# Patient Record
Sex: FEMALE | Race: WHITE | HISPANIC OR LATINO | Employment: FULL TIME | URBAN - METROPOLITAN AREA
[De-identification: names, ages, dates, MRNs, and addresses within clinical notes are randomized per-mention and may not be internally consistent; named-entity substitution may affect disease eponyms.]

---

## 2021-02-08 ENCOUNTER — APPOINTMENT (EMERGENCY)
Dept: RADIOLOGY | Facility: HOSPITAL | Age: 31
End: 2021-02-08
Payer: COMMERCIAL

## 2021-02-08 ENCOUNTER — HOSPITAL ENCOUNTER (EMERGENCY)
Facility: HOSPITAL | Age: 31
Discharge: HOME | End: 2021-02-08
Attending: EMERGENCY MEDICINE | Admitting: EMERGENCY MEDICINE
Payer: COMMERCIAL

## 2021-02-08 VITALS
OXYGEN SATURATION: 100 % | HEART RATE: 68 BPM | HEIGHT: 65 IN | SYSTOLIC BLOOD PRESSURE: 104 MMHG | RESPIRATION RATE: 18 BRPM | DIASTOLIC BLOOD PRESSURE: 60 MMHG | BODY MASS INDEX: 32.65 KG/M2 | WEIGHT: 196 LBS | TEMPERATURE: 98.8 F

## 2021-02-08 DIAGNOSIS — O20.0 THREATENED MISCARRIAGE: Primary | ICD-10-CM

## 2021-02-08 DIAGNOSIS — R07.9 CHEST PAIN, UNSPECIFIED TYPE: ICD-10-CM

## 2021-02-08 DIAGNOSIS — O20.9 VAGINAL BLEEDING IN PREGNANCY, FIRST TRIMESTER: ICD-10-CM

## 2021-02-08 LAB
ABO GROUP BLD: NORMAL
ALBUMIN SERPL BCP-MCNC: 4.2 G/DL (ref 3.4–4.8)
ALP SERPL-CCNC: 46.6 U/L (ref 35–140)
ALT SERPL W P-5'-P-CCNC: 15 U/L (ref 5–54)
ANION GAP SERPL CALCULATED.3IONS-SCNC: 8 MMOL/L (ref 4–13)
AST SERPL W P-5'-P-CCNC: 12 U/L (ref 15–41)
ATRIAL RATE: 61 BPM
B-HCG SERPL-ACNC: ABNORMAL MIU/ML
BASOPHILS # BLD AUTO: 0.01 THOUSANDS/ΜL (ref 0–0.1)
BASOPHILS NFR BLD AUTO: 0 % (ref 0–1)
BILIRUB SERPL-MCNC: 0.33 MG/DL (ref 0.3–1.2)
BUN SERPL-MCNC: 10 MG/DL (ref 6–20)
CALCIUM SERPL-MCNC: 9.4 MG/DL (ref 8.4–10.2)
CHLORIDE SERPL-SCNC: 103 MMOL/L (ref 96–108)
CO2 SERPL-SCNC: 27 MMOL/L (ref 22–33)
CREAT SERPL-MCNC: 0.59 MG/DL (ref 0.4–1.1)
EOSINOPHIL # BLD AUTO: 0.05 THOUSAND/ΜL (ref 0–0.61)
EOSINOPHIL NFR BLD AUTO: 1 % (ref 0–6)
ERYTHROCYTE [DISTWIDTH] IN BLOOD BY AUTOMATED COUNT: 12.4 % (ref 11.6–15.1)
GFR SERPL CREATININE-BSD FRML MDRD: 124 ML/MIN/1.73SQ M
GLUCOSE SERPL-MCNC: 88 MG/DL (ref 65–140)
HCT VFR BLD AUTO: 35.8 % (ref 34.8–46.1)
HGB BLD-MCNC: 12.5 G/DL (ref 11.5–15.4)
IMM GRANULOCYTES # BLD AUTO: 0.02 THOUSAND/UL (ref 0–0.2)
IMM GRANULOCYTES NFR BLD AUTO: 0 % (ref 0–2)
LYMPHOCYTES # BLD AUTO: 2.38 THOUSANDS/ΜL (ref 0.6–4.47)
LYMPHOCYTES NFR BLD AUTO: 28 % (ref 14–44)
MCH RBC QN AUTO: 29.2 PG (ref 26.8–34.3)
MCHC RBC AUTO-ENTMCNC: 34.9 G/DL (ref 31.4–37.4)
MCV RBC AUTO: 84 FL (ref 82–98)
MONOCYTES # BLD AUTO: 0.5 THOUSAND/ΜL (ref 0.17–1.22)
MONOCYTES NFR BLD AUTO: 6 % (ref 4–12)
NEUTROPHILS # BLD AUTO: 5.71 THOUSANDS/ΜL (ref 1.85–7.62)
NEUTS SEG NFR BLD AUTO: 65 % (ref 43–75)
P AXIS: 45 DEGREES
PLATELET # BLD AUTO: 208 THOUSANDS/UL (ref 149–390)
PMV BLD AUTO: 10.8 FL (ref 8.9–12.7)
POTASSIUM SERPL-SCNC: 3.8 MMOL/L (ref 3.5–5)
PR INTERVAL: 166 MS
PROT SERPL-MCNC: 6.6 G/DL (ref 6.4–8.3)
QRS AXIS: 40 DEGREES
QRSD INTERVAL: 85 MS
QT INTERVAL: 398 MS
QTC INTERVAL: 414 MS
RBC # BLD AUTO: 4.28 MILLION/UL (ref 3.81–5.12)
RH BLD: POSITIVE
SODIUM SERPL-SCNC: 138 MMOL/L (ref 133–145)
SPECIMEN EXPIRATION DATE: NORMAL
T WAVE AXIS: 41 DEGREES
TROPONIN I SERPL-MCNC: <0.03 NG/ML (ref 0–0.07)
VENTRICULAR RATE: 65 BPM
WBC # BLD AUTO: 8.67 THOUSAND/UL (ref 4.31–10.16)

## 2021-02-08 PROCEDURE — 80053 COMPREHEN METABOLIC PANEL: CPT | Performed by: EMERGENCY MEDICINE

## 2021-02-08 PROCEDURE — 99285 EMERGENCY DEPT VISIT HI MDM: CPT | Performed by: EMERGENCY MEDICINE

## 2021-02-08 PROCEDURE — 36415 COLL VENOUS BLD VENIPUNCTURE: CPT | Performed by: EMERGENCY MEDICINE

## 2021-02-08 PROCEDURE — 86900 BLOOD TYPING SEROLOGIC ABO: CPT | Performed by: EMERGENCY MEDICINE

## 2021-02-08 PROCEDURE — 86901 BLOOD TYPING SEROLOGIC RH(D): CPT | Performed by: EMERGENCY MEDICINE

## 2021-02-08 PROCEDURE — 85025 COMPLETE CBC W/AUTO DIFF WBC: CPT | Performed by: EMERGENCY MEDICINE

## 2021-02-08 PROCEDURE — 86850 RBC ANTIBODY SCREEN: CPT | Performed by: EMERGENCY MEDICINE

## 2021-02-08 PROCEDURE — 93010 ELECTROCARDIOGRAM REPORT: CPT | Performed by: INTERNAL MEDICINE

## 2021-02-08 PROCEDURE — 84484 ASSAY OF TROPONIN QUANT: CPT | Performed by: EMERGENCY MEDICINE

## 2021-02-08 PROCEDURE — 71045 X-RAY EXAM CHEST 1 VIEW: CPT

## 2021-02-08 PROCEDURE — 99285 EMERGENCY DEPT VISIT HI MDM: CPT

## 2021-02-08 PROCEDURE — 84702 CHORIONIC GONADOTROPIN TEST: CPT | Performed by: EMERGENCY MEDICINE

## 2021-02-08 PROCEDURE — 96360 HYDRATION IV INFUSION INIT: CPT

## 2021-02-08 PROCEDURE — 93005 ELECTROCARDIOGRAM TRACING: CPT

## 2021-02-08 RX ORDER — FERROUS SULFATE 325(65) MG
325 TABLET ORAL EVERY OTHER DAY
COMMUNITY

## 2021-02-08 RX ADMIN — SODIUM CHLORIDE 1000 ML: 0.9 INJECTION, SOLUTION INTRAVENOUS at 19:42

## 2021-02-09 NOTE — ED PROVIDER NOTES
History  Chief Complaint   Patient presents with    Chest Pain     chest pain x1 week covid (+) positive also started with spotting today and is 12 weeks preg      HPI    34 yo F  (9 miscarriages) at 12 weeks presenting to the ed for eval of vaginal bleeding and chest pain  IUP confirmed by US 21  Covid pos 21  Patient having constant chest pain with palpitations  Present for past one week worse now  2/10 currently  Has pleuritic pain  Present since patient has had covid  Has intermittent lightheadedness  Has nausea but no emesis  Patient also started spotting and cramping today  No fetal movement yet  No sob  Not changing any pads per hour  FHR: 160 on transabdominal u/s  Prior to Admission Medications   Prescriptions Last Dose Informant Patient Reported? Taking? Prenatal Vit-Fe Fumarate-FA (PRENATAL PO)   Yes No   Sig: Take 1 tablet by mouth daily   ferrous sulfate 325 (65 Fe) mg tablet 2021 at Unknown time  Yes Yes   Sig: Take 325 mg by mouth every other day      Facility-Administered Medications: None       No past medical history on file  No past surgical history on file  Family History   Problem Relation Age of Onset    Hypertension Mother     Diabetes type II Father         MELLITUS    Hypertension Father     Brain cancer Sister     Diabetes type II Paternal Grandfather         MELLITUS    Colon cancer Paternal Grandfather     Hypertension Paternal Grandfather      I have reviewed and agree with the history as documented  E-Cigarette/Vaping     E-Cigarette/Vaping Substances     Social History     Tobacco Use    Smoking status: Never Smoker    Tobacco comment: NO TOBACCO USE   Substance Use Topics    Alcohol use: Not on file    Drug use: Not on file       Review of Systems   Constitutional: Negative for chills, fatigue and fever  HENT: Negative for sore throat  Eyes: Negative for redness and visual disturbance     Respiratory: Negative for cough and shortness of breath  Cardiovascular: Positive for chest pain  Gastrointestinal: Negative for abdominal pain, diarrhea and nausea  Genitourinary: Positive for vaginal bleeding  Negative for difficulty urinating, dysuria, pelvic pain and vaginal pain  Musculoskeletal: Negative for back pain  Skin: Negative for rash  Neurological: Negative for syncope, weakness and headaches  All other systems reviewed and are negative  Physical Exam  Physical Exam  Vitals signs and nursing note reviewed  Constitutional:       General: She is not in acute distress  HENT:      Head: Normocephalic and atraumatic  Eyes:      Conjunctiva/sclera: Conjunctivae normal    Neck:      Musculoskeletal: Normal range of motion  Cardiovascular:      Rate and Rhythm: Normal rate and regular rhythm  Heart sounds: Normal heart sounds  Comments: No tachycardia  Pulmonary:      Effort: Pulmonary effort is normal  No respiratory distress  Breath sounds: Normal breath sounds  Comments: No tachypnea or hypoxia  100% on room air  Abdominal:      General: Bowel sounds are normal       Palpations: Abdomen is soft  Tenderness: There is no abdominal tenderness  Genitourinary:     Comments: Cervical os closed at this time  No blood in vaginal canal  Musculoskeletal: Normal range of motion  Skin:     General: Skin is warm and dry  Findings: No rash  Neurological:      Mental Status: She is alert and oriented to person, place, and time  Cranial Nerves: No cranial nerve deficit  Sensory: No sensory deficit  Motor: No abnormal muscle tone        Coordination: Coordination normal          Vital Signs  ED Triage Vitals [02/08/21 1905]   Temperature Pulse Respirations Blood Pressure SpO2   98 8 °F (37 1 °C) 61 17 106/59 100 %      Temp Source Heart Rate Source Patient Position - Orthostatic VS BP Location FiO2 (%)   Oral Monitor Sitting Left arm --      Pain Score       3           Vitals: 02/08/21 1905 02/08/21 2058   BP: 106/59 104/60   Pulse: 61 68   Patient Position - Orthostatic VS: Sitting Lying         Visual Acuity      ED Medications  Medications   sodium chloride 0 9 % bolus 1,000 mL (0 mL Intravenous Stopped 2/8/21 2058)       Diagnostic Studies  Results Reviewed     Procedure Component Value Units Date/Time    hCG, quantitative [[id number]]  (Abnormal) Collected: 02/08/21 1939    Lab Status: Final result Specimen: Blood from Arm, Right Updated: 02/08/21 2032     HCG, Quant 67,296 0 mIU/mL     Narrative:      Non pregnant females:          <5 mIU/mL  Pregnant females, weeks post last menstrual period:  3 - 4 weeks                9 - 130 mIU/mL  4 - 5 weeks                75 - 2,600 mIU/mL  5 - 6 weeks                850 - 20,800 mIU/mL  6 - 7 weeks                4,000 - 100,200 mIU/mL  7 - 12 weeks               11,500 - 289,000 mIU/mL  12 - 16 weeks              18,300 - 137,000 mIU/mL  16 - 29 weeks              1,400 - 53,000 mIU/mL (second trimester)  29 - 41 weeks              940 - 60,000 mIU/mL (third trimester)      Troponin I [[id number]]  (Normal) Collected: 02/08/21 1939    Lab Status: Final result Specimen: Blood from Arm, Right Updated: 02/08/21 2007     Troponin I <0 03 ng/mL     Comprehensive metabolic panel [[id number]]  (Abnormal) Collected: 02/08/21 1939    Lab Status: Final result Specimen: Blood from Arm, Right Updated: 02/08/21 2005     Sodium 138 mmol/L      Potassium 3 8 mmol/L      Chloride 103 mmol/L      CO2 27 mmol/L      ANION GAP 8 mmol/L      BUN 10 mg/dL      Creatinine 0 59 mg/dL      Glucose 88 mg/dL      Calcium 9 4 mg/dL      AST 12 U/L      ALT 15 U/L      Alkaline Phosphatase 46 6 U/L      Total Protein 6 6 g/dL      Albumin 4 2 g/dL      Total Bilirubin 0 33 mg/dL      eGFR 124 ml/min/1 73sq m     Narrative:      [place] guidelines for Chronic Kidney Disease (CKD):     Stage 1 with normal or high GFR (GFR > 90 mL/min/1 73 square meters)    Stage 2 Mild CKD (GFR = 60-89 mL/min/1 73 square meters)    Stage 3A Moderate CKD (GFR = 45-59 mL/min/1 73 square meters)    Stage 3B Moderate CKD (GFR = 30-44 mL/min/1 73 square meters)    Stage 4 Severe CKD (GFR = 15-29 mL/min/1 73 square meters)    Stage 5 End Stage CKD (GFR <15 mL/min/1 73 square meters)  Note: GFR calculation is accurate only with a steady state creatinine    CBC and differential [[id number]]  (Normal) Collected: 02/08/21 1939    Lab Status: Final result Specimen: Blood from Arm, Right Updated: 02/08/21 1949     WBC 8 67 Thousand/uL      RBC 4 28 Million/uL      Hemoglobin 12 5 g/dL      Hematocrit 35 8 %      MCV 84 fL      MCH 29 2 pg      MCHC 34 9 g/dL      RDW 12 4 %      MPV 10 8 fL      Platelets 737 Thousands/uL      Neutrophils Relative 65 %      Immat GRANS % 0 %      Lymphocytes Relative 28 %      Monocytes Relative 6 %      Eosinophils Relative 1 %      Basophils Relative 0 %      Neutrophils Absolute 5 71 Thousands/µL      Immature Grans Absolute 0 02 Thousand/uL      Lymphocytes Absolute 2 38 Thousands/µL      Monocytes Absolute 0 50 Thousand/µL      Eosinophils Absolute 0 05 Thousand/µL      Basophils Absolute 0 01 Thousands/µL                  XR chest 1 view portable    (Results Pending)              Procedures  Procedures     [facility]  02/08/21 2041            Heart Score Risk Calculator   History  0          ECG  0          Age  0          Risk Factors  0          Troponin  0          HEART Score  0 (calculated)                  ED Course  ED Course as of Feb 08 2135 Mon Feb 08, 2021 2016 Troponin I: <0 03 2029 Rh Factor: Positive   2032 Spoke with Dr Homero Farley, for vaginal bleeding, only give rhogham if rh neg  Otherwise follow up in obgyn office, needs viability scan likely  For chest pain, patient offered ct pe study, as benefits outweigh risks   however patient at this point feels pain is not that bad and will defer ct scan understanding risks including fetal compromise demise, death, disability, including mother that, disability, undiagnosed blood clot  Patient states that she understands of the risks and would still not like to have CT PE study  2047 UA deferred, patient denies having any urinary symptoms, states she was here for her chest pain and bleeding  Would like to leave, without result for ua  [facility]    34 yo F presenting with chest pain and vaginal spotting  Exam within normal limits  Vitals normal  Known covid, likely chest pain from covid, though hypercoaguable, pe on differential  Spoke to ob, offered ct pe scan, patient declined understanding risks of not getting scan  States she will hold off for now  Froylan still has threatened miscarriage, will follow up with obgyn  Ophelia schaferham needed rh pos  The patient was instructed to follow up as documented  Strict return precautions were discussed with the patient and the patient was instructed to return to the emergency department immediately if symptoms worsen  The patient/patient family member acknowledged and were in agreement with plan  Disposition  Final diagnoses:   Threatened miscarriage   Vaginal bleeding in pregnancy, first trimester   Chest pain, unspecified type     Time reflects when diagnosis was documented in both MDM as applicable and the Disposition within this note     Time User Action Codes Description Comment    2/8/2021  8:40  [address], [address] [O20 0] Threatened miscarriage     2/8/2021  8:40  [address], [address] [O20 9] Vaginal bleeding in pregnancy, first trimester     2/8/2021  8:40 PM [employer] Add [R07 9] Chest pain, unspecified type       ED Disposition     ED Disposition Condition Date/Time Comment    Discharge Stable Mon Feb 8, 2021  8:41 PM Dwaine Acosta discharge to home/self care              Follow-up Information     Follow up With Specialties Details Why Contact [facility], Obstetrics, Gynecology Call in 1 day For follow up regarding your symptoms and recheck  you will likely need a repeat viaility ultrasound  also mention to them your chest pain, and you deferred a ct scan  [address]            Discharge Medication List as of 2/8/2021  8:41 PM      CONTINUE these medications which have NOT CHANGED    Details   ferrous sulfate 325 (65 Fe) mg tablet Take 325 mg by mouth every other day, Historical Med      Prenatal Vit-Fe Fumarate-FA (PRENATAL PO) Take 1 tablet by mouth daily, Historical Med           No discharge procedures on file      PDMP Review     None          ED Provider  Electronically Signed by           Susanne Hayes MD  02/08/21 9280

## 2025-02-22 ENCOUNTER — OFFICE VISIT (OUTPATIENT)
Dept: URGENT CARE | Facility: MEDICAL CENTER | Age: 35
End: 2025-02-22
Payer: COMMERCIAL

## 2025-02-22 VITALS
OXYGEN SATURATION: 98 % | SYSTOLIC BLOOD PRESSURE: 122 MMHG | DIASTOLIC BLOOD PRESSURE: 73 MMHG | TEMPERATURE: 98.9 F | HEART RATE: 80 BPM | RESPIRATION RATE: 18 BRPM

## 2025-02-22 DIAGNOSIS — R30.0 DYSURIA: Primary | ICD-10-CM

## 2025-02-22 LAB
SL AMB  POCT GLUCOSE, UA: ABNORMAL
SL AMB LEUKOCYTE ESTERASE,UA: ABNORMAL
SL AMB POCT BILIRUBIN,UA: ABNORMAL
SL AMB POCT BLOOD,UA: ABNORMAL
SL AMB POCT CLARITY,UA: ABNORMAL
SL AMB POCT COLOR,UA: YELLOW
SL AMB POCT KETONES,UA: ABNORMAL
SL AMB POCT NITRITE,UA: ABNORMAL
SL AMB POCT PH,UA: 7
SL AMB POCT SPECIFIC GRAVITY,UA: 1.01
SL AMB POCT URINE PROTEIN: ABNORMAL
SL AMB POCT UROBILINOGEN: 0.2

## 2025-02-22 PROCEDURE — 87086 URINE CULTURE/COLONY COUNT: CPT | Performed by: FAMILY MEDICINE

## 2025-02-22 PROCEDURE — 81002 URINALYSIS NONAUTO W/O SCOPE: CPT | Performed by: FAMILY MEDICINE

## 2025-02-22 PROCEDURE — 99213 OFFICE O/P EST LOW 20 MIN: CPT | Performed by: FAMILY MEDICINE

## 2025-02-22 RX ORDER — NITROFURANTOIN 25; 75 MG/1; MG/1
100 CAPSULE ORAL 2 TIMES DAILY
Qty: 10 CAPSULE | Refills: 0 | Status: SHIPPED | OUTPATIENT
Start: 2025-02-22 | End: 2025-02-27

## 2025-02-22 RX ORDER — METRONIDAZOLE 500 MG/1
500 TABLET ORAL EVERY 8 HOURS SCHEDULED
Qty: 21 TABLET | Refills: 0 | Status: SHIPPED | OUTPATIENT
Start: 2025-02-22 | End: 2025-03-01

## 2025-02-22 RX ORDER — ALBUTEROL SULFATE 90 UG/1
2 INHALANT RESPIRATORY (INHALATION) EVERY 6 HOURS PRN
COMMUNITY
Start: 2024-10-12

## 2025-02-22 NOTE — PROGRESS NOTES
[facility]        NAME: Guillermina Gross is a 35 y.o. female  : 1990    MRN: [id number]  DATE: 2025  TIME: 8:00 PM    Assessment and Plan   Dysuria [R30.0]  1. Dysuria  POCT urine dip    Urine culture    Urine culture    nitrofurantoin (MACROBID) 100 mg capsule    metroNIDAZOLE (FLAGYL) 500 mg tablet        UTI based on UA  Treated with antibiotics as above.  Will await C/S and change medication if needed.  Supportive care measures discussed  ED precautions discussed.    Also given metronidazole for presumptive BV. Use in sequence.   Discussed Return/ED parameters with patient.       Patient Instructions       Follow up with PCP in 3-5 days.  Proceed to  ER if symptoms worsen.    If tests have been performed at [facility], our office will contact you with results if changes need to be made to the care plan discussed with you at the visit.  [facility]    Chief Complaint     Chief Complaint   Patient presents with   • Vaginal Discharge     Patient states she believes that she may have bacterial vaginosis; had it multiple times while being pregnant and has similar discharge to previous times; no concern of STIs    • Female Dysuria     Urinary urgency, pressure, frequency; x3 months          History of Present Illness       2 months of vaginal discharge that has a fishy odor that is similar to multiple episodes of previous BV  Also has about 3 months of urinary discomfort, frequency, urgency. One episode of urinary incontinence.    Urinary Tract Infection   This is a new problem. The current episode started more than 1 month ago. The problem occurs every urination. The problem has been unchanged. The quality of the pain is described as burning. There has been no fever. Associated symptoms include a discharge, frequency and urgency. Pertinent negatives include no chills, flank pain, hematuria, hesitancy, nausea, possible pregnancy, sweats or  vomiting.       Review of Systems   Review of Systems   Constitutional:  Negative for chills.   Gastrointestinal:  Negative for nausea and vomiting.   Genitourinary:  Positive for frequency and urgency. Negative for flank pain, hematuria and hesitancy.         Current Medications       Current Outpatient Medications:   •  albuterol (PROVENTIL HFA,VENTOLIN HFA) 90 mcg/act inhaler, Inhale 2 puffs every 6 (six) hours as needed, Disp: , Rfl:   •  metroNIDAZOLE (FLAGYL) 500 mg tablet, Take 1 tablet (500 mg total) by mouth every 8 (eight) hours for 7 days, Disp: 21 tablet, Rfl: 0  •  nitrofurantoin (MACROBID) 100 mg capsule, Take 1 capsule (100 mg total) by mouth 2 (two) times a day for 5 days, Disp: 10 capsule, Rfl: 0  •  ascorbic acid (VITAMIN C) 250 MG CHEW, Chew, Disp: , Rfl:   •  ferrous sulfate 325 (65 Fe) mg tablet, Take 325 mg by mouth every other day, Disp: , Rfl:   •  Prenatal Vit-Fe Fumarate-FA (PRENATAL PO), Take 1 tablet by mouth daily, Disp: , Rfl:     Current Allergies     Allergies as of 02/22/2025 - Reviewed 02/22/2025   Allergen Reaction Noted   • Apple fruit extract - food allergy Hives 10/21/2020   • Bactrim [sulfamethoxazole-trimethoprim] Hives 02/08/2021   • Codeine Hives 02/08/2021   • Lactose - food allergy Other (See Comments) 08/29/2019   • Latex Hives 02/08/2021   • Influenza virus vaccine Rash 10/21/2020   • Insulin detemir Rash 04/22/2021   • Insulin glargine Rash 04/28/2021            The following portions of the patient's history were reviewed and updated as appropriate: allergies, current medications, past family history, past medical history, past social history, past surgical history and problem list.     History reviewed. No pertinent past medical history.    History reviewed. No pertinent surgical history.    Family History   Problem Relation Age of Onset   • Hypertension Mother    • Diabetes type II Father         MELLITUS   • Hypertension Father    • Brain cancer Sister    • Diabetes  type II Paternal Grandfather         MELLITUS   • Colon cancer Paternal Grandfather    • Hypertension Paternal Grandfather          Medications have been verified.        Objective   /73   Pulse 80   Temp 98.9 °F (37.2 °C) (Temporal)   Resp 18   SpO2 98%   No LMP recorded.       Physical Exam     Physical Exam  Vitals reviewed.   Constitutional:       General: She is not in acute distress.     Appearance: Normal appearance. She is not ill-appearing, toxic-appearing or diaphoretic.   HENT:      Head: Normocephalic and atraumatic.      Right Ear: Tympanic membrane normal.      Left Ear: Tympanic membrane normal.      Nose: Nose normal.      Mouth/Throat:      Mouth: Mucous membranes are moist.      Pharynx: No oropharyngeal exudate or posterior oropharyngeal erythema.   Eyes:      Pupils: Pupils are equal, round, and reactive to light.   Cardiovascular:      Rate and Rhythm: Normal rate and regular rhythm.      Heart sounds: No murmur heard.  Pulmonary:      Effort: Pulmonary effort is normal. No respiratory distress.      Breath sounds: Normal breath sounds.   Abdominal:      General: Abdomen is flat.      Palpations: Abdomen is soft.      Tenderness: There is no abdominal tenderness. There is no right CVA tenderness or left CVA tenderness.   Musculoskeletal:         General: Normal range of motion.      Cervical back: Normal range of motion. No rigidity or tenderness.   Lymphadenopathy:      Cervical: No cervical adenopathy.   Skin:     General: Skin is warm and dry.      Capillary Refill: Capillary refill takes less than 2 seconds.   Neurological:      General: No focal deficit present.      Mental Status: She is alert and oriented to person, place, and time. Mental status is at baseline.   Psychiatric:         Mood and Affect: Mood normal.         Behavior: Behavior normal.         Thought Content: Thought content normal.

## 2025-02-24 LAB — BACTERIA UR CULT: NORMAL
